# Patient Record
Sex: FEMALE | Race: WHITE | NOT HISPANIC OR LATINO | Employment: UNEMPLOYED | URBAN - METROPOLITAN AREA
[De-identification: names, ages, dates, MRNs, and addresses within clinical notes are randomized per-mention and may not be internally consistent; named-entity substitution may affect disease eponyms.]

---

## 2017-03-08 ENCOUNTER — AMBULATORY - HEALTHEAST (OUTPATIENT)
Dept: ADDICTION MEDICINE | Facility: HOSPITAL | Age: 27
End: 2017-03-08

## 2017-03-08 DIAGNOSIS — F12.20 CANNABIS USE DISORDER, MODERATE, DEPENDENCE (H): ICD-10-CM

## 2017-03-09 ENCOUNTER — AMBULATORY - HEALTHEAST (OUTPATIENT)
Dept: ADDICTION MEDICINE | Facility: HOSPITAL | Age: 27
End: 2017-03-09

## 2017-03-17 ENCOUNTER — AMBULATORY - HEALTHEAST (OUTPATIENT)
Dept: ADDICTION MEDICINE | Facility: HOSPITAL | Age: 27
End: 2017-03-17

## 2017-04-20 ENCOUNTER — AMBULATORY - HEALTHEAST (OUTPATIENT)
Dept: ADDICTION MEDICINE | Facility: HOSPITAL | Age: 27
End: 2017-04-20

## 2017-04-20 ENCOUNTER — OFFICE VISIT - HEALTHEAST (OUTPATIENT)
Dept: ADDICTION MEDICINE | Facility: HOSPITAL | Age: 27
End: 2017-04-20

## 2017-04-20 DIAGNOSIS — F12.20 CANNABIS USE DISORDER, MODERATE, DEPENDENCE (H): ICD-10-CM

## 2017-04-21 ENCOUNTER — OFFICE VISIT - HEALTHEAST (OUTPATIENT)
Dept: ADDICTION MEDICINE | Facility: HOSPITAL | Age: 27
End: 2017-04-21

## 2017-04-21 DIAGNOSIS — F12.20 CANNABIS USE DISORDER, MODERATE, DEPENDENCE (H): ICD-10-CM

## 2017-04-24 ENCOUNTER — OFFICE VISIT - HEALTHEAST (OUTPATIENT)
Dept: ADDICTION MEDICINE | Facility: HOSPITAL | Age: 27
End: 2017-04-24

## 2017-04-24 DIAGNOSIS — F12.20 CANNABIS USE DISORDER, MODERATE, DEPENDENCE (H): ICD-10-CM

## 2017-04-25 ENCOUNTER — AMBULATORY - HEALTHEAST (OUTPATIENT)
Dept: ADDICTION MEDICINE | Facility: HOSPITAL | Age: 27
End: 2017-04-25

## 2017-04-26 ENCOUNTER — COMMUNICATION - HEALTHEAST (OUTPATIENT)
Dept: ADDICTION MEDICINE | Facility: HOSPITAL | Age: 27
End: 2017-04-26

## 2017-04-27 ENCOUNTER — AMBULATORY - HEALTHEAST (OUTPATIENT)
Dept: ADDICTION MEDICINE | Facility: HOSPITAL | Age: 27
End: 2017-04-27

## 2017-04-28 ENCOUNTER — OFFICE VISIT - HEALTHEAST (OUTPATIENT)
Dept: ADDICTION MEDICINE | Facility: HOSPITAL | Age: 27
End: 2017-04-28

## 2017-04-28 ENCOUNTER — AMBULATORY - HEALTHEAST (OUTPATIENT)
Dept: ADDICTION MEDICINE | Facility: HOSPITAL | Age: 27
End: 2017-04-28

## 2017-04-28 DIAGNOSIS — F12.20 CANNABIS USE DISORDER, MODERATE, DEPENDENCE (H): ICD-10-CM

## 2017-05-01 ENCOUNTER — COMMUNICATION - HEALTHEAST (OUTPATIENT)
Dept: ADDICTION MEDICINE | Facility: HOSPITAL | Age: 27
End: 2017-05-01

## 2017-05-01 ENCOUNTER — AMBULATORY - HEALTHEAST (OUTPATIENT)
Dept: ADDICTION MEDICINE | Facility: HOSPITAL | Age: 27
End: 2017-05-01

## 2017-05-02 ENCOUNTER — AMBULATORY - HEALTHEAST (OUTPATIENT)
Dept: ADDICTION MEDICINE | Facility: HOSPITAL | Age: 27
End: 2017-05-02

## 2017-05-03 ENCOUNTER — OFFICE VISIT - HEALTHEAST (OUTPATIENT)
Dept: ADDICTION MEDICINE | Facility: HOSPITAL | Age: 27
End: 2017-05-03

## 2017-05-03 DIAGNOSIS — F12.20 CANNABIS USE DISORDER, MODERATE, DEPENDENCE (H): ICD-10-CM

## 2017-05-08 ENCOUNTER — AMBULATORY - HEALTHEAST (OUTPATIENT)
Dept: ADDICTION MEDICINE | Facility: HOSPITAL | Age: 27
End: 2017-05-08

## 2017-05-12 ENCOUNTER — AMBULATORY - HEALTHEAST (OUTPATIENT)
Dept: ADDICTION MEDICINE | Facility: HOSPITAL | Age: 27
End: 2017-05-12

## 2017-05-21 ENCOUNTER — AMBULATORY - HEALTHEAST (OUTPATIENT)
Dept: ADDICTION MEDICINE | Facility: HOSPITAL | Age: 27
End: 2017-05-21

## 2017-05-22 ENCOUNTER — COMMUNICATION - HEALTHEAST (OUTPATIENT)
Dept: ADDICTION MEDICINE | Facility: HOSPITAL | Age: 27
End: 2017-05-22

## 2017-05-22 ENCOUNTER — AMBULATORY - HEALTHEAST (OUTPATIENT)
Dept: ADDICTION MEDICINE | Facility: HOSPITAL | Age: 27
End: 2017-05-22

## 2017-05-25 ENCOUNTER — AMBULATORY - HEALTHEAST (OUTPATIENT)
Dept: ADDICTION MEDICINE | Facility: HOSPITAL | Age: 27
End: 2017-05-25

## 2019-06-29 ENCOUNTER — RECORDS - HEALTHEAST (OUTPATIENT)
Dept: ADMINISTRATIVE | Facility: OTHER | Age: 29
End: 2019-06-29

## 2021-06-09 NOTE — PROGRESS NOTES
Pt arrived for scheduled 2:30pm intake at 2:45pm. Pt was informed that intake would last 60-90 minutes and indicated she wished to reschedule. Pt rescheduled for Tuesday, 3/21/17 at 2:30pm.
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
## 2021-06-10 NOTE — PROGRESS NOTES
Weekly Progress Note  Fidencio Ulloa  1990  [id number]      D) Pt attended 1 groups  this week with 3 absences. A) Staff facilitated groups and reviewed tx progress. Assessed for [facility]. R) No VAP needed at this time. Pt working on the following dimensions:    Dimension #1 - Withdrawal Potential - Risk 0. No current concerns. Pt reports last use of methamphetamine on 4/13. Pt reports continuing daily cannabis use.     Dimension #2 - Biomedical - Risk 0. No current concerns.    Dimension #3 - Emotional/Behavioral/Cognitive - Risk 1. Pt reports no increased MH symptoms though has requested MH services. Pt has been directed to support to navigate [place] and [place] for Medical Assistance to enable her to secure coverage for MH services. Pt has participated in weekly topic on Gaining Strengths and identified being a 'jolly person' as her greatest strength.     Dimension #4 - Treatment Acceptance/Resistance - Risk 1.Ct has only attended 4 groups since starting IOP 4/20/17.  She has not been successful in communicating absences with staff and admits tthat she is only in treatment due to court mandates.      Dimension #5 - Relapse Potential - Risk 4. Pt reports last methamphetamine use 4/13/17 and continuing daily cannabis use. Pt reports no developed relapse-prevention tools. Pt is beginning to learn about underlying factors affecting addiction and recidivism, including mental health and environmental components. Pt reports some support group attendance in the past though has not yet attended any support groups since beginning IOP.     Dimension #6 - Recovery Environment - Risk 3. Pt lives with her step-father whom she reports uses mood-altering substances. Pt has legal involvement in [facility] and Child Protection involvement. Pt reports no set structure outside of IOP programming.     T) Client educated on Strengths.     Client has completed 12 of 144 hours of program at this time.     Projected discharge date is  "08/25/17.     Current discharge plan is PENDING.     LUCILLE Izquierdo        Psycho-Educational Curriculum  Date Attended  Psycho-Educational Curriculum  Date Attended    Acceptance   Shame/Guilt       Anger/Rage     Strengths 5/3 Grief/Loss 4/24/17   Affirmations   Mental Health     Automatic Negative Thoughts   Anxiety     Cross Addiction   Co-Occurring Disorders     Stages of Change   Keli/Bipolar     Relapse   Trauma      Addictive Thoughts   Victim Identity     Coping Skills   Sober Structure     Relapse Prevention   Continuum of Care     Medical Aspects   Non-12 Step Support     Brain/Neurotransmitters   Priorities     Medication Compliance   Spirituality     NENA Alcohol/Drug Research   Weekend Planner     Physical Health   Educational Videos     Post Acute Withdrawal       Pregnancy and Drug Use   Lost in [place]    Sexual Health   Assertive Communication     Short-Term/Long-Term Effects   My name is Rob FRITZLitzy    Relationships   Cross Addiction     Assertive Communication   God As We Understood Him     Boundaries   HBO Relapse     Codependence    HBO What Is Addiction     Defense Mechanisms    Medical Aspects 1     Family Roles   Medical Aspects 2     Goodbye Letter   National Geographic: Stress     Intimacy   PBS Depression Out of the Shadows     Needs/Dealbreakers in Relationships   The Anonymous People    3 Circles  Up    Socialization Skills   [place]     Feelings   Nitesh Zendejas \"Highjacked Brain\"    Feelings Identification  Inside Out    ABC Model of Emotion   Brown Loaiza Humor in Tx    Grief and Loss   The Mindfulness Movie    Healthy vs. Unhealthy Feelings   Rob MOSQUEDA documentary     Meditation/Mindfulness   Pleasure Unwoven    Overconfidence/Complacency       Resentments       Stress       Grounding        D) Ct was staffed this date.  Present were LUCILLE Izquierdo MA ADCT-T  See Ct's weekly Progress Review Note    LUCILLE Izquierdo MA    "

## 2021-06-10 NOTE — PROGRESS NOTES
Pt absent from IOP. No VM or other communication with counselor.     Pt absent from scheduled Tx planning following group.
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
## 2021-06-10 NOTE — PROGRESS NOTES
Weekly Progress Note  Fidencio Ulloa  1990  [id number]      D) Pt attended 0 groups  this week with 4 absences, with no calls. A) Staff unable to review tx progress. Staff unable to assess for [facility]. R)  Pt working on the following dimensions and last contact of 5/3/17:    Dimension #1 - Withdrawal Potential - Risk 0. No current concerns. Pt reports last use of methamphetamine on 4/13. Pt reports continuing daily cannabis use.     Dimension #2 - Biomedical - Risk 0. No current concerns.    Dimension #3 - Emotional/Behavioral/Cognitive - Risk 1. Pt reports no increased MH symptoms though has requested MH services. Pt has been directed to support to navigate [place] and register for Medical Assistance to enable her to secure coverage for MH services. Pt has participated in weekly topic on Gaining Strengths and identified being a 'jolly person' as her greatest strength.     Dimension #4 - Treatment Acceptance/Resistance - Risk 1.Ct has only attended 4 groups since starting IOP 4/20/17.  She has not been successful in communicating absences with staff and admits tthat she is only in treatment due to court mandates.      Dimension #5 - Relapse Potential - Risk 4. Pt reports last methamphetamine use 4/13/17 and continuing daily cannabis use. Pt reports no developed relapse-prevention tools. Pt is beginning to learn about underlying factors affecting addiction and recidivism, including mental health and environmental components. Pt reports some support group attendance in the past though has not yet attended any support groups since beginning IOP.     Dimension #6 - Recovery Environment - Risk 3. Pt lives with her step-father whom she reports uses mood-altering substances. Pt has legal involvement in [facility] and Child Protection involvement. Pt reports no set structure outside of IOP programming.     T) Client educated on Strengths.     Client has completed 12 of 144 hours of program at this time.     Projected  "discharge date is 08/25/17.     Current discharge plan is PENDING.     LUCILLE Izquierdo        Psycho-Educational Curriculum  Date Attended  Psycho-Educational Curriculum  Date Attended    Acceptance   Shame/Guilt       Anger/Rage     Strengths 5/3 Grief/Loss 4/24/17   Affirmations   Mental Health     Automatic Negative Thoughts   Anxiety     Cross Addiction   Co-Occurring Disorders     Stages of Change   Keli/Bipolar     Relapse   Trauma      Addictive Thoughts   Victim Identity     Coping Skills   Sober Structure     Relapse Prevention   Continuum of Care     Medical Aspects   Non-12 Step Support     Brain/Neurotransmitters   Priorities     Medication Compliance   Spirituality     NENA Alcohol/Drug Research   Weekend Planner     Physical Health   Educational Videos     Post Acute Withdrawal       Pregnancy and Drug Use   Lost in [place]    Sexual Health   Assertive Communication     Short-Term/Long-Term Effects   My name is Rob MOSQUEDA    Relationships   Cross Addiction     Assertive Communication   God As We Understood Him     Boundaries   HBO Relapse     Codependence    HBO What Is Addiction     Defense Mechanisms    Medical Aspects 1     Family Roles   Medical Aspects 2     Goodbye Letter   National Geographic: Stress     Intimacy   PBS Depression Out of the Shadows     Needs/Dealbreakers in Relationships   The Anonymous People    3 Circles  Up    Socialization Skills   [place]     Feelings   Nitesh Zendejas \"Highjacked Brain\"    Feelings Identification  Inside Out    ABC Model of Emotion   Brown Loaiza Humor in Tx    Grief and Loss   The Mindfulness Movie    Healthy vs. Unhealthy Feelings   Rob MOSQUEDA documentary     Meditation/Mindfulness   Pleasure Unwoven    Overconfidence/Complacency       Resentments       Stress       Grounding        D) Ct was staffed this date.  Present were LUCILLE Izquierdo MA ADCT-T  See Ct's weekly Progress Review Note    LUCILLE Izquierdo MA    "

## 2021-06-10 NOTE — PROGRESS NOTES
Fidencio Ulloa attended 3 hours of group therapy today.    Pt met w counselor individually following IOP group for Tx Planning. Pt provided comprehensive list of NA and CMA meetings within 20 mile radius of her home address. Pt reports significant physical pain which she attributes to carpal tunnel syndrome. Pt missed appt with /navigator at [facility] this am to apply for Medical Assistance. Pt strongly encouraged to reschedule this and to contact a medical clinic and explain her current medical needs and MA situation as many clinics provide MA navigation/registration services on-site. Pt identified [facility] as a previous provider and was provided contact info for HE medical clinics as well. Pt reports she normally uses cannabis for physical pain but has not used in two days. Pt strongly encouraged to contact a medical provider.     4/28/2017 2:11 PM Bao Bailey

## 2021-06-10 NOTE — PROGRESS NOTES
Fidencio Ulloa attended 3 hours of group therapy today.    Pt attended her first IOP group today. Pt reports last methamphetamine use approx 1 week ago though reports having used cannabis prior to group this am. Pt met briefly w counselor following group and was provided contact info for a [facility] Navigator to assist her in procuring Medical Assistance as Pt indicated interest in seeing an individual therapist. Pt was presented with group expectation that she abstain from chemical use prior to Tx groups and Pt agreed to this. Pt will meet w counselor for Tx Planning on Tue 4/25 after Tx group. Pt participated in group discussion around relationships, co-dependency and care-taking/caregiving roles.     4/21/2017 1:35 PM Bao Bailey
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
## 2021-06-10 NOTE — PROGRESS NOTES
Addiction Services - Initial Services Plan      Name:  Fidencio Ulloa  :  1990       MRN:  [id number]     Goal Methods   1.  Acceptance of chemical dependency and mental illness as a disease. A.  Comply with all med/psych recommendations  B.  Complete preliminary interviews  C.  Attend all program functions  D.  Attend all individual counseling sessions  E.  Read all assigned literature  F.  Complete psychological testing as recommended  G.  Particpate in any necessary consultations     2.  Acceptance of my need and ability to change A.  Complete any assignments.  B.  Participate in conferences (P.O., , family)  C.  Participate in 12 Step/support groups  D.  Actively participate in treatment planning  E.  Complete all assignments given or recommended on Treatment Plan     3.  Acceptance of staff recommendations as a means to my recovery A.  Participate in all interviews for Continuum of Care Plans  B.  Familiarize self with recovery program and how this applies to your day-to-day behavior       Patient describes their immediate need:  Pt reports none  Are there any immediate Safety Needs such as (physical, stability, mobility):  Pt reports none.    Immediate Health Needs and Plan:  Pt reports none.   Vulnerable Adult:  No    [] Continue Current Medications for:   [x] Request Consult for: DA  [] Notify Attending Physician about:  [x] Other:  PO    Issues to be addressed in the first sessions:    Become acquainted with group norms and other group members.    Set up time to meet with primary counselor 1:1.     Patient strengths and needs:  Learn coping skills  Strengths: willingness to learn, gets along with people     Needs: additional sober relationships and structure, relapse prevention coping skills.     Plan for patient for time between intake and completion of the treatment plan:  Remain abstinent from any illicit substance/alcohol use and start group on 17.   Participate in all treatment  activities and assignments.     Staff Members' Titles authorized to Initiate Services are:    Director of Behavioral Service    Clinical Director of Chemical Dependency    Primary Counselor    MI/DEBRA Sr. Counselor        Nursing Staff    Vulnerable Adult Review  [x] Review of the facility Abuse Prevention plan was reviewed with the patient  [x] No individual abuse plan is necessary  [] In addition to the facility Abuse Prevention plan, an Individual Abuse Plan will be put in place    I understand these goals to be the Treatment Goals of the Program, and I agree to the stated Methods in attempting to accomplish these goals.          Patient Signature:  _________________________Date:  ___________________    Staff Name/Title:  BRIAN Izquierdo, [facility]  Date:  4/20/2017  Time: 1:39 PM

## 2021-06-10 NOTE — PROGRESS NOTES
Pt arrived at group near a group break time and notified counselor she had pulled a muscle and had scheduled a medial appt at her clinic on [address]. Pt request counselor contact her [facility]  (LUCY on file) to verify program attendance.

## 2021-06-10 NOTE — PROGRESS NOTES
Fidencio Ulloa attended 2 hours of group therapy today.    Pt reports using cannabis on Sat 4/22. Pt reports continuing abstinence from methamphetamine.     4/24/2017 1:39 PM Bao Bailey

## 2021-06-10 NOTE — PROGRESS NOTES
Pt last attended [facility] on 5/4/17. Pt has not contacted counselor/program since 5/4/17. Pt's contact information appears to be incorrect and/or outdated (see telephone note from 5/22). Pt will be discharged from [facility] effective 5/22/17 with recommendations to gender-specific residential-level CD Tx, likely through [facility]. D/C summary to [facility].

## 2021-06-10 NOTE — PROGRESS NOTES
Weekly Progress Note  Fidencio Ulloa  1990  [id number]      D) Pt attended 2 groups  this week with 2 absences. A) Staff facilitated groups and reviewed tx progress. Assessed for [facility]. R) No VAP needed at this time. Pt working on the following dimensions:    Dimension #1 - Withdrawal Potential - Risk 0. No current concerns. Pt reports last use of methamphetamine on 4/13. Pt reports continuing daily cannabis use.     Dimension #2 - Biomedical - Risk 0. No current concerns.    Dimension #3 - Emotional/Behavioral/Cognitive - Risk 1. Pt reports no increased MH symptoms though has requested MH services. Pt has been directed to support [place] navigate [place] and [place] to enable her to secure coverage for MH services. Pt has participated in weekly topic on Grief and Loss and shared that she has experiences both. Pt has also shared that she     Dimension #4 - Treatment Acceptance/Resistance - Risk 1. Pt has missed 2 of 4 groups over her first week of programming - Pt reports a court date that ran long on 4/25 and then oversleeping on 4/26.      Dimension #5 - Relapse Potential - Risk 4. Pt reports last methamphetamine use 4/13/17 and continuing daily cannabis use. Pt reports no developed relapse-prevention tools. Pt is beginning to learn about underlying factors affecting addiction and recidivism, including mental health and environmental components. Pt reports some support group attendance in the past though has not yet attended any support groups since beginning IOP.     Dimension #6 - Recovery Environment - Risk 3. Pt lives with her step-father whom she reports uses mood-altering substances. Pt has legal involvement in [facility] and Child Protection involvement. Pt reports no set structure outside of IOP programming.     T) Client educated on Grief and Loss.     Client has completed 6 of 144 hours of program at this time.     Projected discharge date is 08/25/17.     Current discharge plan is  "PENDING.     LUCILLE Morales        Psycho-Educational Curriculum  Date Attended  Psycho-Educational Curriculum  Date Attended    Acceptance   Shame/Guilt       Anger/Rage     Strengths  Grief/Loss 4/24/17   Affirmations   Mental Health     Automatic Negative Thoughts   Anxiety     Cross Addiction   Co-Occurring Disorders     Stages of Change   Keli/Bipolar     Relapse   Trauma      Addictive Thoughts   Victim Identity     Coping Skills   Sober Structure     Relapse Prevention   Continuum of Care     Medical Aspects   Non-12 Step Support     Brain/Neurotransmitters   Priorities     Medication Compliance   Spirituality     NENA Alcohol/Drug Research   Weekend Planner     Physical Health   Educational Videos     Post Acute Withdrawal       Pregnancy and Drug Use   Lost in [place]    Sexual Health   Assertive Communication     Short-Term/Long-Term Effects   My name is Rob MOSQUEDA    Relationships   Cross Addiction     Assertive Communication   God As We Understood Him     Boundaries   HBO Relapse     Codependence    HBO What Is Addiction     Defense Mechanisms    Medical Aspects 1     Family Roles   Medical Aspects 2     Goodbye Letter   National Geographic: Stress     Intimacy   PBS Depression Out of the Shadows     Needs/Dealbreakers in Relationships   The Anonymous People    3 Circles  Up    Socialization Skills   [place]     Feelings   Nitesh Zendejas \"Highjacked Brain\"    Feelings Identification  Inside Out    ABC Model of Emotion   Brown Loaiza Humor in Tx    Grief and Loss   The Mindfulness Movie    Healthy vs. Unhealthy Feelings   Rob MOSQUEDA documentary     Meditation/Mindfulness   Pleasure Unwoven    Overconfidence/Complacency       Resentments       Stress       Grounding        "

## 2021-06-10 NOTE — PROGRESS NOTES
Weekly Progress Note  Fidencio Ulloa  1990  [id number]      D) Pt attended 0groups  this week with 4 absences, with no calls. A) Staff unable to review tx progress. Staff unable to assess for VA. R)  Pt working on the following dimensions and last contact of 5/3/17:    Dimension #1 - Withdrawal Potential - Risk 0. No current concerns. Pt reports last use of methamphetamine on 4/13. Pt reports continuing daily cannabis use.     Dimension #2 - Biomedical - Risk 0. No current concerns.    Dimension #3 - Emotional/Behavioral/Cognitive - Risk 1. Pt reports no increased MH symptoms though has requested MH services. Pt has been directed to support to navigate [place] and register for Medical Assistance to enable her to secure coverage for MH services. Pt has participated in weekly topic on Gaining Strengths and identified being a 'jolly person' as her greatest strength.     Dimension #4 - Treatment Acceptance/Resistance - Risk 1.Ct has only attended 4 groups since starting IOP 4/20/17.  She has not been successful in communicating absences with staff and admits tthat she is only in treatment due to court mandates.      Dimension #5 - Relapse Potential - Risk 4. Pt reports last methamphetamine use 4/13/17 and continuing daily cannabis use. Pt reports no developed relapse-prevention tools. Pt is beginning to learn about underlying factors affecting addiction and recidivism, including mental health and environmental components. Pt reports some support group attendance in the past though has not yet attended any support groups since beginning IOP.     Dimension #6 - Recovery Environment - Risk 3. Pt lives with her step-father whom she reports uses mood-altering substances. Pt has legal involvement in [facility] and Child Protection involvement. Pt reports no set structure outside of IOP programming.     T) Client educated on Strengths.     Client has completed 12 of 144 hours of program at this time.     Projected  "discharge date is 08/25/17.     Current discharge plan is PENDING.     LUCILLE Izquierdo        Psycho-Educational Curriculum  Date Attended  Psycho-Educational Curriculum  Date Attended    Acceptance   Shame/Guilt       Anger/Rage     Strengths 5/3 Grief/Loss 4/24/17   Affirmations   Mental Health     Automatic Negative Thoughts   Anxiety     Cross Addiction   Co-Occurring Disorders     Stages of Change   Keli/Bipolar     Relapse   Trauma      Addictive Thoughts   Victim Identity     Coping Skills   Sober Structure     Relapse Prevention   Continuum of Care     Medical Aspects   Non-12 Step Support     Brain/Neurotransmitters   Priorities     Medication Compliance   Spirituality     NENA Alcohol/Drug Research   Weekend Planner     Physical Health   Educational Videos     Post Acute Withdrawal       Pregnancy and Drug Use   Lost in [place]    Sexual Health   Assertive Communication     Short-Term/Long-Term Effects   My name is Rob MOSQUEDA    Relationships   Cross Addiction     Assertive Communication   God As We Understood Him     Boundaries   HBO Relapse     Codependence    HBO What Is Addiction     Defense Mechanisms    Medical Aspects 1     Family Roles   Medical Aspects 2     Goodbye Letter   National Geographic: Stress     Intimacy   PBS Depression Out of the Shadows     Needs/Dealbreakers in Relationships   The Anonymous People    3 Circles  Up    Socialization Skills   [place]     Feelings   Nitesh Zendejas \"Highjacked Brain\"    Feelings Identification  Inside Out    ABC Model of Emotion   Brown Loaiza Humor in Tx    Grief and Loss   The Mindfulness Movie    Healthy vs. Unhealthy Feelings   Rob MOSQUEDA documentary     Meditation/Mindfulness   Pleasure Unwoven    Overconfidence/Complacency       Resentments       Stress       Grounding        D) Ct was staffed this date.  Present were LUCILLE Izquierdo MA ADCT-T  See Ct's weekly Progress Review Note    LUCILLE Izquierdo MA    "

## 2021-06-14 ENCOUNTER — HOSPITAL ENCOUNTER (EMERGENCY)
Facility: CLINIC | Age: 31
Discharge: HOME | End: 2021-06-14
Attending: EMERGENCY MEDICINE | Admitting: EMERGENCY MEDICINE
Payer: COMMERCIAL

## 2021-06-14 VITALS
SYSTOLIC BLOOD PRESSURE: 131 MMHG | RESPIRATION RATE: 16 BRPM | TEMPERATURE: 98 F | HEART RATE: 70 BPM | OXYGEN SATURATION: 98 % | DIASTOLIC BLOOD PRESSURE: 86 MMHG

## 2021-06-14 DIAGNOSIS — R46.89 BEHAVIOR CONCERN: ICD-10-CM

## 2021-06-14 PROCEDURE — 99285 EMERGENCY DEPT VISIT HI MDM: CPT | Mod: 25 | Performed by: EMERGENCY MEDICINE

## 2021-06-14 PROCEDURE — 90791 PSYCH DIAGNOSTIC EVALUATION: CPT

## 2021-06-14 PROCEDURE — 99283 EMERGENCY DEPT VISIT LOW MDM: CPT | Performed by: EMERGENCY MEDICINE

## 2021-06-14 RX ORDER — ACETAMINOPHEN 325 MG/1
TABLET ORAL
COMMUNITY

## 2021-06-14 NOTE — ED TRIAGE NOTES
"Patient was instructed to come here by her treatment center. She states she told them she \"took a handful of pills\" but states that this was a lie. She states that she did not take any pills.  "

## 2021-06-14 NOTE — DISCHARGE INSTRUCTIONS
Please follow-up with your outpatient providers as scheduled. If you have any worsening symptoms including thoughts of harm to self or others immediately return to the emergency department for reevaluation.

## 2021-06-14 NOTE — ED PROVIDER NOTES
[facility] EMERGENCY DEPARTMENT ([facility])    6/14/21        History     Chief Complaint   Patient presents with     Mental Health Problem     Patient was instructed to come here by her treatment center. She states she told them she took a handful of pills but states that this was a lie.      The history is provided by the patient and medical records.     Fidencio Ulloa is a 30 year old otherwise healthy female who presents to the Emergency Department for mental health evaluation from her sober treatment center. Patient told the house mom at her sober house that she took a bunch of pills and was then sent here to the ED. She states she stated this in order to get attention and did not take any pills. She states she recently stepped down to a less intensive program ans is not as connected with the staff so she wanted attention. Patient reports she never felt suicidal and has never attempted suicide. Patient reports she has been sober for 45 days and graduates from the program on 6/18. She states she is starting a job at [employer] next week. Patient reports she lost her children 6 years ago but is able to see them soon. Patient is not prescribed any medications and states she has no access to medication. She denies any other acute medical concerns including any abdominal pain, nausea, vomiting.     Past Medical History  No past medical history on file.  No past surgical history on file.  acetaminophen (TYLENOL) 325 MG tablet  prazosin (MINIPRESS) 100 MCG capsule      No Known Allergies  Family History  No family history on file.  Social History   Social History     Tobacco Use     Smoking status: Not on file   Substance Use Topics     Alcohol use: Not on file     Drug use: Not on file      Past medical history, past surgical history, medications, allergies, family history, and social history were reviewed with the patient. No additional pertinent items.       Review of Systems   Psychiatric/Behavioral: Negative  for suicidal ideas.   All other systems reviewed and are negative.    A complete review of systems was performed with pertinent positives and negatives noted in the HPI, and all other systems negative.    Physical Exam   BP: 115/59  Pulse: 78  Temp: 98  F (36.7  C)  Resp: 16  SpO2: 98 %  Physical Exam  General: patient is alert and oriented and in no acute distress   Head: atraumatic and normocephalic   EENT: moist mucus membranes, sclera anicteric   neck: supple   Cardiovascular: regular rate and rhythm, extremities warm and well perfused  Pulmonary: No respiratory distress   Musculoskeletal: normal range of motion   Neurological: alert and oriented, moving all extremities symmetrically, gait normal   Skin: warm, dry   Psych: Speech is normal in rate and tone, thought processes linear, patient is well-groomed, normal affect, does not appear to be responding to internal stimuli, denies SI    ED Course      Procedures           No results found for any visits on 06/14/21.  Medications - No data to display     Assessments & Plan (with Medical Decision Making)   Ms. Ulloa is a 30 year old otherwise healthy female who presents to the Emergency Department for mental health evaluation after a reportedly ingesting medication. She adamantly denies actually taking any medication and does not have access to meds. She denies any suicidal ideation and has good forward thinking with a number of events that she is looking forward to. She denies any other acute mental health concerns. At this time I do feel she is safe for discharge back to her sober living facility. She was given close return precautions for any worsening symptoms and voiced understanding.    I have reviewed the nursing notes. I have reviewed the findings, diagnosis, plan and need for follow up with the patient.    New Prescriptions    No medications on file       Final diagnoses:   Behavior concern     KRYSTYNA, Cesia Gallegos, am serving as a trained medical scribe to  document services personally performed by Meghan Arroyo MD, based on the provider's statements to me.      I, Meghan Arroyo MD, was physically present and have reviewed and verified the accuracy of this note documented by Cesia Gallegos.     --  Meghan Arroyo MD  [facility] EMERGENCY DEPARTMENT  6/14/2021     Meghan Arroyo MD  06/14/21 1127
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
